# Patient Record
Sex: MALE | Race: WHITE
[De-identification: names, ages, dates, MRNs, and addresses within clinical notes are randomized per-mention and may not be internally consistent; named-entity substitution may affect disease eponyms.]

---

## 2020-09-03 ENCOUNTER — APPOINTMENT (OUTPATIENT)
Dept: PULMONOLOGY | Facility: CLINIC | Age: 45
End: 2020-09-03
Payer: COMMERCIAL

## 2020-09-03 VITALS
WEIGHT: 269 LBS | BODY MASS INDEX: 36.44 KG/M2 | DIASTOLIC BLOOD PRESSURE: 78 MMHG | HEIGHT: 72 IN | RESPIRATION RATE: 14 BRPM | TEMPERATURE: 98.6 F | HEART RATE: 77 BPM | OXYGEN SATURATION: 96 % | SYSTOLIC BLOOD PRESSURE: 135 MMHG

## 2020-09-03 DIAGNOSIS — R06.02 SHORTNESS OF BREATH: ICD-10-CM

## 2020-09-03 DIAGNOSIS — Z78.9 OTHER SPECIFIED HEALTH STATUS: ICD-10-CM

## 2020-09-03 DIAGNOSIS — R05 COUGH: ICD-10-CM

## 2020-09-03 DIAGNOSIS — R06.83 SNORING: ICD-10-CM

## 2020-09-03 PROBLEM — Z00.00 ENCOUNTER FOR PREVENTIVE HEALTH EXAMINATION: Status: ACTIVE | Noted: 2020-09-03

## 2020-09-03 PROCEDURE — 94060 EVALUATION OF WHEEZING: CPT

## 2020-09-03 PROCEDURE — 99244 OFF/OP CNSLTJ NEW/EST MOD 40: CPT | Mod: 25

## 2020-09-03 PROCEDURE — 94729 DIFFUSING CAPACITY: CPT

## 2020-09-03 PROCEDURE — 95012 NITRIC OXIDE EXP GAS DETER: CPT

## 2020-09-03 PROCEDURE — 94726 PLETHYSMOGRAPHY LUNG VOLUMES: CPT

## 2020-09-03 NOTE — ASSESSMENT
[FreeTextEntry1] : In summary the patient is a 45-year-old male who presents for pulmonary consult.  The patient's history and physical are consistent with asthma.  The patient underwent a pulmonary function test as well as FeNO\par Patient is now started on Spiriva and instructed to follow-up in 6 weeks

## 2020-09-03 NOTE — HISTORY OF PRESENT ILLNESS
[Never] : never [TextBox_4] : Patient is a 45-year-old male with past medical history significant for asthma who presents for pulmonary consult.  The patient denies any fevers chills chest pain weight loss or hemoptysis.  The patient does not smoke

## 2020-09-03 NOTE — PHYSICAL EXAM
[No Acute Distress] : no acute distress [Normal Rate/Rhythm] : normal rate/rhythm [Normal Oropharynx] : normal oropharynx [Normal Appearance] : normal appearance [No Neck Mass] : no neck mass [No Murmurs] : no murmurs [No Resp Distress] : no resp distress [Normal S1, S2] : normal s1, s2 [No Abnormalities] : no abnormalities [Clear to Auscultation Bilaterally] : clear to auscultation bilaterally [Benign] : benign [No Clubbing] : no clubbing [Normal Gait] : normal gait [No Cyanosis] : no cyanosis [No Edema] : no edema [FROM] : FROM [Normal Color/ Pigmentation] : normal color/ pigmentation [Oriented x3] : oriented x3 [No Focal Deficits] : no focal deficits [Normal Affect] : normal affect